# Patient Record
Sex: MALE
[De-identification: names, ages, dates, MRNs, and addresses within clinical notes are randomized per-mention and may not be internally consistent; named-entity substitution may affect disease eponyms.]

---

## 2018-03-24 ENCOUNTER — HOSPITAL ENCOUNTER (EMERGENCY)
Dept: HOSPITAL 25 - UCEAST | Age: 58
Discharge: HOME | End: 2018-03-24
Payer: COMMERCIAL

## 2018-03-24 VITALS — SYSTOLIC BLOOD PRESSURE: 107 MMHG | DIASTOLIC BLOOD PRESSURE: 72 MMHG

## 2018-03-24 DIAGNOSIS — J11.1: Primary | ICD-10-CM

## 2018-03-24 PROCEDURE — 87502 INFLUENZA DNA AMP PROBE: CPT

## 2018-03-24 PROCEDURE — 99212 OFFICE O/P EST SF 10 MIN: CPT

## 2018-03-24 PROCEDURE — G0463 HOSPITAL OUTPT CLINIC VISIT: HCPCS
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
## 2019-11-06 ENCOUNTER — HOSPITAL ENCOUNTER (OUTPATIENT)
Dept: HOSPITAL 25 - OREAST | Age: 59
Discharge: HOME | End: 2019-11-06
Attending: SPECIALIST
Payer: COMMERCIAL

## 2019-11-06 VITALS — SYSTOLIC BLOOD PRESSURE: 113 MMHG | DIASTOLIC BLOOD PRESSURE: 70 MMHG

## 2019-11-06 DIAGNOSIS — J30.2: ICD-10-CM

## 2019-11-06 DIAGNOSIS — H25.812: Primary | ICD-10-CM

## 2019-11-06 PROCEDURE — V2787 ASTIGMATISM-CORRECT FUNCTION: HCPCS

## 2019-11-06 NOTE — OP
DATE OF OPERATION:  11/06/2019 [facility]

 

YOB: 1960.

 

SURGEON:  Chadwick Maxwell M.D.

 

PREOPERATIVE DIAGNOSIS:  Cataract left eye.

 

POSTOPERATIVE DIAGNOSIS:  Cataract left eye.

 

OPERATIVE PROCEDURE:  Extracapsular cataract extraction with intraocular lens 
implant left eye.

 

DESCRIPTION OF PROCEDURE:  The patient was brought to the operating room after 
being given 1/2% Alcaine with epinephrine drops in the preoperative area.  The 
eye was prepped and draped in the usual sterile fashion.  Sterile drape and 
eyelid speculum were placed.  Again, topical 1/2% Alcaine with epinephrine was 
given.  A paracentesis incision was made at the 3 o'clock position with the 
No.75 blade.  Clear cornea incision 2.2 x 2.2-mm was created at the 6 o'clock 
position starting at the anterior limbus using the 2.2-mm keratome.  The 
anterior chamber was irrigated with 0.4 mL of 1% non-preservative intracameral 
lidocaine and filled with DisCoVisc.  A capsulorrhexis was completed using the 
cystotome and the Utrata forceps. Hydrodissection was performed with balanced 
salt solution.  The lens nucleus was removed with the Phacoemulsification 
handpiece without incident.  Cortex was removed with the irrigation-aspiration 
handpiece.  The capsular bag was re-inflated using DisCoVisc and an [id number] 13 
implant was inserted with the shooter.  All measurements confirmed with ORA.  
Horizontal reference marks were made with the patient in a seated position in 
the preoperative area.  The irrigation-aspiration handpiece was used to remove 
all residual DisCoVisc.  The eye was refilled with balanced salt solution and 
the wound checked and found to be watertight.  Topical Maxitrol drops were 
given.

 

 [id number]/[facility] #: [id number]

MTDD

## 2019-11-13 ENCOUNTER — HOSPITAL ENCOUNTER (OUTPATIENT)
Dept: HOSPITAL 25 - OREAST | Age: 59
Discharge: HOME | End: 2019-11-13
Attending: SPECIALIST
Payer: COMMERCIAL

## 2019-11-13 VITALS — DIASTOLIC BLOOD PRESSURE: 66 MMHG | SYSTOLIC BLOOD PRESSURE: 104 MMHG

## 2019-11-13 DIAGNOSIS — Z96.1: ICD-10-CM

## 2019-11-13 DIAGNOSIS — J30.2: ICD-10-CM

## 2019-11-13 DIAGNOSIS — H25.811: Primary | ICD-10-CM

## 2019-11-13 PROCEDURE — V2787 ASTIGMATISM-CORRECT FUNCTION: HCPCS

## 2019-11-13 NOTE — OP
DATE OF OPERATION:  11/13/2019.

 

YOB: 1960.

 

SURGEON:  Chadwick Maxwell M.D.

 

PREOPERATIVE DIAGNOSIS:  Cataract right eye.

 

POSTOPERATIVE DIAGNOSIS:  Cataract right eye.

 

OPERATIVE PROCEDURE:  Extracapsular cataract extraction with intraocular lens implant right eye.

 

PROCEDURE:  The patient was brought to the operating room after being given 1/2% Alcaine with epineph
rine drops in the preoperative area.  The eye was prepped and draped in the usual sterile fashion.  S
terile drape and eyelid speculum were placed.  Again, topical 1/2% Alcaine with epinephrine was given
.  A paracentesis incision was made at the 9 o'clock position with the No.75 blade.  Clear cornea inc
ision 2.2 x 2.2-mm was created at the 12 o'clock position starting at the anterior limbus using the 2
.2-mm keratome.  The anterior chamber was irrigated with 0.4 mL of 1% non-preservative intracameral l
idocaine and filled with DisCoVisc.  A capsulorrhexis was completed using the cystotome and the Utrat
a forceps. Hydrodissection was performed with balanced salt solution.  The lens nucleus was removed w
ith the Phacoemulsification handpiece without incident.  Cortex was removed with the irrigation-aspir
ation handpiece.  The capsular bag was re-inflated using DisCoVisc and an SN6AT3 12.5 implant was ins
erted with the shooter, oriented to the 178 degree meridian.  All measurements were based on ORA.  Th
e irrigation- aspiration handpiece was used to remove all residual DisCoVisc.  The eye was refilled w
ith balanced salt solution and the wound checked and found to be watertight.  Topical Maxitrol drops 
were given.

 

 [id number]/[facility] #: [id number]